# Patient Record
Sex: FEMALE | Race: WHITE | NOT HISPANIC OR LATINO | Employment: FULL TIME | URBAN - METROPOLITAN AREA
[De-identification: names, ages, dates, MRNs, and addresses within clinical notes are randomized per-mention and may not be internally consistent; named-entity substitution may affect disease eponyms.]

---

## 2017-07-21 ENCOUNTER — CONVERSION ENCOUNTER (OUTPATIENT)
Dept: OTHER | Facility: HOSPITAL | Age: 52
End: 2017-07-21

## 2018-07-25 ENCOUNTER — CONVERSION ENCOUNTER (OUTPATIENT)
Dept: OTHER | Facility: HOSPITAL | Age: 53
End: 2018-07-25

## 2018-09-06 ENCOUNTER — OFFICE VISIT (OUTPATIENT)
Dept: ORTHOPEDIC SURGERY | Facility: CLINIC | Age: 53
End: 2018-09-06

## 2018-09-06 DIAGNOSIS — M25.532 LEFT WRIST PAIN: Primary | ICD-10-CM

## 2018-09-06 DIAGNOSIS — M67.432 GANGLION CYST OF VOLAR ASPECT OF LEFT WRIST: ICD-10-CM

## 2018-09-06 PROCEDURE — 99214 OFFICE O/P EST MOD 30 MIN: CPT | Performed by: ORTHOPAEDIC SURGERY

## 2018-09-06 RX ORDER — PHENYTOIN SODIUM 100 MG/1
CAPSULE, EXTENDED RELEASE ORAL
COMMUNITY
Start: 2012-05-01

## 2018-09-06 RX ORDER — LISINOPRIL 5 MG/1
5 TABLET ORAL DAILY
COMMUNITY

## 2018-09-06 RX ORDER — ROSUVASTATIN CALCIUM 10 MG/1
10 TABLET, COATED ORAL DAILY
COMMUNITY

## 2018-09-06 NOTE — PROGRESS NOTES
Chief Complaint   Patient presents with   • Left Wrist - Pain             HPI the patient is here today for left wrist pain and discomfort.  The patient has developed a mass on the volar aspect of the wrist.  It is about 2 cm in size.  She states that it has been present on the volar aspect of the wrist for about 3-4 months.  She states that it is becoming larger in size and progressively more symptomatic.  The mass bothers her when she is working and impairs her  strength.  She denies any recent history of trauma to this area.  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  When she is lifting heavy weights the mass is more symptomatic and painful for her.  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  This high level of physical activity causes increased symptoms.          No Known Allergies      Social History     Social History   • Marital status:      Spouse name: N/A   • Number of children: N/A   • Years of education: N/A     Occupational History   • Not on file.     Social History Main Topics   • Smoking status: Not on file   • Smokeless tobacco: Not on file   • Alcohol use Not on file   • Drug use: Unknown   • Sexual activity: Not on file     Other Topics Concern   • Not on file     Social History Narrative   • No narrative on file       History reviewed. No pertinent family history.    No past surgical history on file.    History reviewed. No pertinent past medical history.        There were no vitals filed for this visit.          Review of Systems   Constitutional: Negative.    HENT: Negative.    Eyes: Negative.    Respiratory: Negative.    Cardiovascular: Negative.    Gastrointestinal: Negative.    Endocrine: Negative.    Genitourinary: Negative.    Musculoskeletal: Positive for joint swelling.   Skin: Negative.    Allergic/Immunologic: Negative.    Neurological: Negative.    Hematological: Negative.    Psychiatric/Behavioral:  Negative.            Physical Exam   Constitutional: She appears well-developed and well-nourished.   HENT:   Head: Atraumatic.   Eyes: EOM are normal.   Neck: Neck supple.   Cardiovascular: Normal rate and regular rhythm.    Pulmonary/Chest: Breath sounds normal.   Abdominal: Bowel sounds are normal.   Musculoskeletal: She exhibits edema and tenderness.   Neurological: She is alert.   Skin: Skin is warm. Capillary refill takes 2 to 3 seconds.   Psychiatric: She has a normal mood and affect. Her behavior is normal. Judgment and thought content normal.   Nursing note and vitals reviewed.              Joint/Body Part Specific Exam:  Left Wrist: There is a 2 cm mass on the volar aspect of the radius.  It is in the immediate vicinity of the radial artery.  No abnormal pulses or bruits are noted.  She has tenderness along the sheath of the FCR tendon.  There is mild thickening of the synovitis membrane of the wrist joint.  Cap refill is 2 seconds with a brisk return.  Median nerve function is well preserved.  Radial artery pulses are palpable.  There is no evidence of a reflex sympathetic dystrophy.  She has good  strength and good strength of wrist flexion and extension.  The mass is appropriately tender.          X-RAY Report:            Diagnostics:            Krystyna was seen today for pain.    Diagnoses and all orders for this visit:    Left wrist pain    Ganglion cyst of volar aspect of left wrist            Procedures          I provided this patient with educational materials regarding bone health and cast or splint care.        Plan:   Use a wrist splint to protect the volar soft tissues and to compress the ganglion cyst.    Intra-articular steroid injection discussed with the patient but she declines stating that she wants to have a surgical resection of this ganglion cyst.    Tablet ibuprofen 600 mg orally twice a day for pain swelling and discomfort.    Schedule a left volar ganglion cyst resection on an  outpatient basis.    Risks and benefits and time off work discussed with the patient at length.    Time spent in the office today with the patient going over the rationale for surgical intervention and the postoperative management is 30 minutes.  Greater than 50% of my time was spent face-to-face with the patient discussing the surgical options and potential complications.        CC To Jeffrey Ross MD

## 2018-09-11 PROBLEM — M67.432 GANGLION CYST OF VOLAR ASPECT OF LEFT WRIST: Status: ACTIVE | Noted: 2018-09-11

## 2018-09-14 ENCOUNTER — TELEPHONE (OUTPATIENT)
Dept: ORTHOPEDIC SURGERY | Facility: CLINIC | Age: 53
End: 2018-09-14

## 2018-09-24 ENCOUNTER — OUTSIDE FACILITY SERVICE (OUTPATIENT)
Dept: ORTHOPEDIC SURGERY | Facility: CLINIC | Age: 53
End: 2018-09-24

## 2018-09-24 PROCEDURE — 25111 REMOVE WRIST TENDON LESION: CPT | Performed by: ORTHOPAEDIC SURGERY

## 2018-09-28 ENCOUNTER — OFFICE VISIT (OUTPATIENT)
Dept: ORTHOPEDIC SURGERY | Facility: CLINIC | Age: 53
End: 2018-09-28

## 2018-09-28 DIAGNOSIS — M67.432 GANGLION CYST OF VOLAR ASPECT OF LEFT WRIST: Primary | ICD-10-CM

## 2018-09-28 PROCEDURE — 99024 POSTOP FOLLOW-UP VISIT: CPT | Performed by: ORTHOPAEDIC SURGERY

## 2018-09-28 NOTE — PROGRESS NOTES
Chief Complaint   Patient presents with   • Left Wrist - Post-op   • Wound Check         HPI the patient is here today for left volar ganglion cyst excision that was done on 9/24/18 she is doing very well postoperatively.  There are no fevers, rigors or chills.  Neurovascular status is intact.  Cap refill is 2 seconds with a brisk return.  She is moving her fingers well and is pleased with her surgical outcome.  She is going to see the cardiologist for the heart conduction block that was detected on the preoperative EKG.        There were no vitals filed for this visit.        Joint/Body Part Specific Exam:  Left wrist: The incision on the volar aspect of the wrist is healing nicely.  Appropriate amounts of swelling and bruising are noted.  Radial artery pulses are palpable.  Cap refill is 2 seconds with a brisk return.  Sensation is intact to light touch.  She has intact median nerve function.  She is doing very well postoperatively and states that she is glad that the pain and discomfort from the volar ganglion cyst has resolved following the resection.      X-RAY REPORT:        Krystyna was seen today for wound check and post-op.    Diagnoses and all orders for this visit:    Ganglion cyst of volar aspect of left wrist            Procedures        Instructions:      Plan: Incision care.    Gentle active mobilization of the wrist and the fingers.    Ice to the area of the incision.    Patient has been given a 1 week off work so that she can recuperate from the surgical incision.    Pathology of the ganglion cyst discussed with the patient.    Tablet ibuprofen 600 mg orally twice a day for pain swelling and discomfort.    We did detect a first degree heart block on the  Pre- Operative testing and therefore I have recommended to the patient that she should see a cardiologist for evaluation of that heart block.  The patient tells me that she has a cardiology appointment next week for evaluation of the cardiac status of  the conduction system of the heart.    Follow-up in my office in 6 weeks.

## 2018-10-16 ENCOUNTER — OFFICE VISIT CONVERTED (OUTPATIENT)
Dept: CARDIOLOGY | Facility: CLINIC | Age: 53
End: 2018-10-16
Attending: INTERNAL MEDICINE

## 2018-10-16 ENCOUNTER — CONVERSION ENCOUNTER (OUTPATIENT)
Dept: CARDIOLOGY | Facility: CLINIC | Age: 53
End: 2018-10-16

## 2018-11-08 ENCOUNTER — OFFICE VISIT (OUTPATIENT)
Dept: ORTHOPEDIC SURGERY | Facility: CLINIC | Age: 53
End: 2018-11-08

## 2018-11-08 DIAGNOSIS — M67.432 GANGLION CYST OF VOLAR ASPECT OF LEFT WRIST: Primary | ICD-10-CM

## 2018-11-08 PROCEDURE — 99024 POSTOP FOLLOW-UP VISIT: CPT | Performed by: ORTHOPAEDIC SURGERY

## 2018-11-26 ENCOUNTER — CONVERSION ENCOUNTER (OUTPATIENT)
Dept: CARDIOLOGY | Facility: CLINIC | Age: 53
End: 2018-11-26
Attending: INTERNAL MEDICINE

## 2019-07-29 ENCOUNTER — HOSPITAL ENCOUNTER (OUTPATIENT)
Dept: OTHER | Facility: HOSPITAL | Age: 54
Discharge: HOME | End: 2019-07-29
Attending: FAMILY MEDICINE

## 2020-08-18 ENCOUNTER — CONVERSION ENCOUNTER (OUTPATIENT)
Dept: SURGERY | Facility: CLINIC | Age: 55
End: 2020-08-18

## 2020-08-18 ENCOUNTER — OFFICE VISIT CONVERTED (OUTPATIENT)
Dept: SURGERY | Facility: CLINIC | Age: 55
End: 2020-08-18
Attending: NURSE PRACTITIONER

## 2020-10-28 ENCOUNTER — HOSPITAL ENCOUNTER (OUTPATIENT)
Dept: PREADMISSION TESTING | Facility: HOSPITAL | Age: 55
Discharge: HOME | End: 2020-10-28
Attending: SURGERY

## 2020-10-30 LAB — SARS-COV-2 RNA SPEC QL NAA+PROBE: NOT DETECTED

## 2020-11-02 ENCOUNTER — HOSPITAL ENCOUNTER (OUTPATIENT)
Dept: GASTROENTEROLOGY | Facility: HOSPITAL | Age: 55
Setting detail: HOSPITAL OUTPATIENT SURGERY
Discharge: HOME | End: 2020-11-02
Attending: SURGERY

## 2020-12-01 ENCOUNTER — TELEPHONE CONVERTED (OUTPATIENT)
Dept: SURGERY | Facility: CLINIC | Age: 55
End: 2020-12-01
Attending: NURSE PRACTITIONER

## 2020-12-01 ENCOUNTER — CONVERSION ENCOUNTER (OUTPATIENT)
Dept: SURGERY | Facility: CLINIC | Age: 55
End: 2020-12-01

## 2021-05-10 NOTE — H&P
History and Physical      Patient Name: Krystyna Covarrubias   Patient ID: [id number]   Sex: Female   YOB: 1965    Primary Care Provider: Jeffrey Ross MD   Referring Provider: Jeffrey Ross MD    Visit Date: August 18, 2020    Provider: [facility] Main   Location[facility]   Location Address: [address] [place] [address]  [id number]   Location Phone: [phone]          Chief Complaint  · Requesting colonoscopy  · Age 50 or over  · FH of colon cancer  · Here today for a pre-surgical colon screening visit  · Personal History of Polyps      History Of Present Illness  The patient is a 54 year old /White female presenting to the [facility] office on a referral from Jeffrey Ross MD.   Krystyna Covarrubias needs to have a screening colonoscopy.   Patient states that they have had a colonoscopy. 3 years ago   Patient currently complains of: no complaints   Patient Does have family history of colon cancer. Father      Patient presents today on referral from Dr. Jeffrey Ross for screening colonoscopy.  Patient denies any abdominal pain, diarrhea, or rectal bleeding.  Patient admits to family history of colon cancer with her father.  Patient admits to history of colonic polyps with high-grade dysplasia.    8/2017 - Colonoscopy (Michael): Ascending - Tubulovillous adenoma with high grade dysplasia.       Past Medical History  Disease Name Date Onset Notes   Arthritis --  --    Colon polyp --  --    Epilepsy --  --    High blood pressure --  --    High cholesterol --  --    Hypothyroid --  --          Past Surgical History  Procedure Name Date Notes   Breast Surgery --  --    Cholecystectomy --  --    Colonoscopy --  --          Medication List  Name Date Started Instructions   Crestor 10 mg oral tablet  take 1 tablet (10 mg) by oral route once daily   Dilantin Extended 100 mg oral capsule  take 1 capsule (100 mg) by oral route 4 times per day   levothyroxine 137 mcg oral tablet  take 1 tablet  "(137 mcg) by oral route once daily   lisinopril 5 mg oral tablet  take 1 tablet (5 mg) by oral route once daily   pantoprazole 40 mg oral tablet,delayed release (DR/EC)  take 1 tablet (40 mg) by oral route once daily   vitamin B complex oral tablet  --    Vitamin D3 50 mcg (2,000 unit) oral capsule  --          Allergy List  Allergen Name Date Reaction Notes   NO KNOWN DRUG ALLERGIES --  --  --        Allergies Reconciled  Family Medical History  Disease Name Relative/Age Notes   Diabetes, unspecified type Brother/  Father/  Sister/   Father; Brother; Sister   Renal Cancer Sister/   --    Family history of colon cancer Father/80s   Father/80s         Social History  Finding Status Start/Stop Quantity Notes   Alcohol Current some day --/-- --  drinks rarely; wine   Caffeine Current every day --/-- --  drinks regularly; 3-4 times per day   Second hand smoke exposure Current some day --/-- --  yes   Tobacco Former [age] --  former smoker; started smoking at age 16; quit smoking at age 47         Review of Systems  · Constitutional  o Denies  o : fever, chills  · Eyes  o Denies  o : yellowish discoloration of eyes  · HENT  o Denies  o : difficulty swallowing  · Cardiovascular  o Denies  o : chest pain, chest pain on exertion  · Respiratory  o Denies  o : shortness of breath  · Gastrointestinal  o Denies  o : nausea, vomiting, diarrhea, constipation  · Genitourinary  o Denies  o : abnormal color of urine  · Integument  o Denies  o : rash  · Neurologic  o Denies  o : tingling or numbness  · Musculoskeletal  o Denies  o : joint pain  · Endocrine  o Denies  o : weight gain, weight loss      Vitals  Date Time BP Position Site L\R Cuff Size HR RR TEMP (F) WT  HT  BMI kg/m2 BSA m2 O2 Sat        08/18/2020 02:00 PM      86 - R   175lbs 16oz 5'  2\" 32.19 1.87 93 %          Physical Examination  · Constitutional  o Appearance  o : well developed, well-nourished, patient in no apparent distress  · Head and Face  o Head  o : "   § Inspection  § : atraumatic, normocephalic  o Face  o :   § Inspection  § : no facial lesions  · Eyes  o Conjunctivae  o : conjunctivae normal  o Sclerae  o : sclerae white  · Neck  o Inspection/Palpation  o : normal appearance, no masses or tenderness, trachea midline  · Respiratory  o Respiratory Effort  o : breathing unlabored  · Skin and Subcutaneous Tissue  o General Inspection  o : no lesions present, no areas of discoloration, skin turgor normal, texture normal  · Neurologic  o Mental Status Examination  o :   § Orientation  § : grossly oriented to person, place and time  § Attention  § : attention normal, concentration abilities normal  § Fund of Knowledge  § : fund of knowledge within normal limits, patient aware of current events  o Gait and Station  o : normal gait, able to stand without difficulty  · Psychiatric  o Judgement and Insight  o : judgment and insight intact  o Mood and Affect  o : mood normal, affect appropriate              Assessment  · Family History of Colon Cancer     V16.0/Z80.0  · Personal history of colonic polyps     V12.72/Z86.010  · Screening for colon cancer     V76.51/Z12.11  · Pre-op testing     V72.84/Z01.818  · High grade dysplasia in colonic adenoma     211.3/D12.6      Plan  · Orders  o Consent for Colonoscopy Screening-Possible risk/complications, benefits, and alternatives to surgical or invasive procedure have been explained to patient and/or legal guardian. -Patient has been evaluated and can tolerate anesthesia and/or sedation. Risks, benefits, and alternatives to anesthesia and sedation have been explained to patient and/or legal guardian. () - V76.51/Z12.11, V12.72/Z86.010, V16.0/Z80.0, 211.3/D12.6 - 11/02/2020  o [facility] Pre-Op Covid-19 Screening (15756) - V72.84/Z01.818 - 10/28/2020   [address] @ 2:30pm  · Medications  o Medications have been Reconciled  o Transition of Care or Provider Policy  · Instructions  o Surgical Facility: [facility]  Hospital  o Handouts Provided Pre-Procedure Instructions including date, time, and location of procedure.   o PLAN: Proceeed with colonoscopy. Patient understands risks/benefits and is willing to proceed.   o ***Surgical Orders***  o RISK AND BENEFITS:  o Given these options, the patient has verbally expressed an understanding of the risks of the surgery and finds these risks acceptable. Will proceed with surgery as soon as possible.  o O.R. PREP: Per protocol   o IV: Per Anesthesia  o Please sign permit for: Colonoscopy with possible biopsies by Dr. Rodriguez.  o The above History and Physical Examination has been completed within 30 days of admission.  o ***Patient Status***  o Outpatient  o Follow up in the in the office post procedure.  o Advised patient she would need COVID-19 testing prior to procedure. Encourage patient to self isolate in between testing and procedure. Patient verbalizes understanding is willing to proceed  o Electronically Identified Patient Education Materials Provided Electronically  · Disposition  o Call or Return if symptoms worsen or persist.            Electronically Signed by: RICCO Main -Author on August 18, 2020 03:00:55 PM

## 2021-05-13 NOTE — PROGRESS NOTES
"   Quick Note      Patient Name: Krystyna Covarrubias   Patient ID: [id number]   Sex: Female   YOB: 1965    Primary Care Provider: Jeffrey Ross MD   Referring Provider: Jeffrey Ross MD    Visit Date: December 1, 2020    Provider: [facility] Main   Location: [facility]   Location Address: [address] [place] [address]  [id number]   Location Phone: [phone]          History Of Present Illness  TELEHEALTH TELEPHONE VISIT  Krystyna Covarrubias is a 55 year old /White female who is presenting for evaluation via telehealth telephone visit. Verbal consent obtained before beginning visit.   Provider spent 12 minutes with the patient during the telehealth visit.   The following staff were present during this visit: Carin Padron CMA   Past Medical History/ Overview of Patient Symptoms     This is a telephone follow-up visit to discuss results of colonoscopy.  Patient underwent a colonoscopy on 11/2/2020 performed by Dr. Grant Rodriguez.    Patient was with a tubular adenoma in the ascending colon and 2 hyperplastic polyps of the sigmoid colon per colonoscopy/pathology.  Patient denies any postoperative complications.       Vitals  Date Time BP Position Site L\R Cuff Size HR RR TEMP (F) WT  HT  BMI kg/m2 BSA m2 O2 Sat FR L/min FiO2 HC       12/01/2020 02:35 PM         164lbs 0oz 5'  2\" 30 1.8                 Assessment  · Hyperplastic colon polyp     211.3/K63.5  · Tubular adenoma     229.9/D36.9  · S/P colonoscopic polypectomy     V45.89/Z98.890    Problems Reconciled  Plan  · Orders  o Physician Telephone Evaluation, 11-20 minutes ([id number]) - 211.3/K63.5, 229.9/D36.9, V45.89/Z98.890 - 12/01/2020  · Medications  o Medications have been Reconciled  o Transition of Care or Provider Policy  · Instructions  o Plan Of Care: Rescreen: In 5 years. Follow-up in the interim as needed  o Electronically Identified Patient Education Materials Provided Electronically            Electronically Signed " by: RICCO Main -Author on December 1, 2020 02:41:59 PM

## 2021-05-14 VITALS — HEIGHT: 62 IN | BODY MASS INDEX: 32.39 KG/M2 | HEART RATE: 86 BPM | OXYGEN SATURATION: 93 % | WEIGHT: 176 LBS

## 2021-05-14 VITALS — WEIGHT: 164 LBS | HEIGHT: 62 IN | BODY MASS INDEX: 30.18 KG/M2

## 2021-05-16 VITALS
BODY MASS INDEX: 32.02 KG/M2 | WEIGHT: 174 LBS | SYSTOLIC BLOOD PRESSURE: 131 MMHG | HEIGHT: 62 IN | DIASTOLIC BLOOD PRESSURE: 62 MMHG | HEART RATE: 85 BPM

## 2024-11-05 ENCOUNTER — TRANSCRIBE ORDERS (OUTPATIENT)
Dept: ADMINISTRATIVE | Facility: HOSPITAL | Age: 59
End: 2024-11-05
Payer: COMMERCIAL

## 2024-11-05 DIAGNOSIS — R09.89 BRUIT: ICD-10-CM

## 2024-11-05 DIAGNOSIS — R07.9 CHEST PAIN, UNSPECIFIED TYPE: Primary | ICD-10-CM

## 2024-12-05 ENCOUNTER — HOSPITAL ENCOUNTER (OUTPATIENT)
Dept: NUCLEAR MEDICINE | Facility: HOSPITAL | Age: 59
Discharge: HOME | End: 2024-12-05
Payer: COMMERCIAL

## 2024-12-05 DIAGNOSIS — R07.9 CHEST PAIN, UNSPECIFIED TYPE: ICD-10-CM

## 2024-12-05 LAB
BH CV IMMEDIATE POST TECH DATA BLOOD PRESSURE: NORMAL MMHG
BH CV IMMEDIATE POST TECH DATA HEART RATE: 109 BPM
BH CV IMMEDIATE POST TECH DATA OXYGEN SATS: 97 %
BH CV REST NUCLEAR ISOTOPE DOSE: 10.2 MCI
BH CV SIX MINUTE RECOVERY TECH DATA BLOOD PRESSURE: NORMAL
BH CV SIX MINUTE RECOVERY TECH DATA HEART RATE: 90 BPM
BH CV SIX MINUTE RECOVERY TECH DATA OXYGEN SATURATION: 96 %
BH CV STRESS BP STAGE 1: NORMAL
BH CV STRESS COMMENTS STAGE 1: NORMAL
BH CV STRESS DOSE REGADENOSON STAGE 1: 0.4
BH CV STRESS DURATION MIN STAGE 1: 0
BH CV STRESS DURATION SEC STAGE 1: 10
BH CV STRESS HR STAGE 1: 110
BH CV STRESS NUCLEAR ISOTOPE DOSE: 32.7 MCI
BH CV STRESS O2 STAGE 1: 96
BH CV STRESS PROTOCOL 1: NORMAL
BH CV STRESS RECOVERY BP: NORMAL MMHG
BH CV STRESS RECOVERY HR: 90 BPM
BH CV STRESS RECOVERY O2: 96 %
BH CV STRESS STAGE 1: 1
BH CV THREE MINUTE POST TECH DATA BLOOD PRESSURE: NORMAL MMHG
BH CV THREE MINUTE POST TECH DATA HEART RATE: 103 BPM
BH CV THREE MINUTE POST TECH DATA OXYGEN SATURATION: 95 %
LV EF NUC BP: 51 %
MAXIMAL PREDICTED HEART RATE: 159 BPM
PERCENT MAX PREDICTED HR: 69.18 %
STRESS BASELINE BP: NORMAL MMHG
STRESS BASELINE HR: 72 BPM
STRESS O2 SAT REST: 96 %
STRESS PERCENT HR: 81 %
STRESS POST O2 SAT PEAK: 96 %
STRESS POST PEAK BP: NORMAL MMHG
STRESS POST PEAK HR: 110 BPM
STRESS TARGET HR: 135 BPM

## 2024-12-05 PROCEDURE — A9500 TC99M SESTAMIBI: HCPCS | Performed by: SPECIALIST

## 2024-12-05 PROCEDURE — 25010000002 REGADENOSON 0.4 MG/5ML SOLUTION: Performed by: SPECIALIST

## 2024-12-05 PROCEDURE — 78451 HT MUSCLE IMAGE SPECT SING: CPT

## 2024-12-05 PROCEDURE — 34310000005 TECHNETIUM SESTAMIBI: Performed by: SPECIALIST

## 2024-12-05 PROCEDURE — 93017 CV STRESS TEST TRACING ONLY: CPT

## 2024-12-05 RX ORDER — REGADENOSON 0.08 MG/ML
0.4 INJECTION, SOLUTION INTRAVENOUS
Status: COMPLETED | OUTPATIENT
Start: 2024-12-05 | End: 2024-12-05

## 2024-12-05 RX ADMIN — REGADENOSON 0.4 MG: 0.08 INJECTION, SOLUTION INTRAVENOUS at 09:23

## 2024-12-05 RX ADMIN — TECHNETIUM TC 99M SESTAMIBI 1 DOSE: 1 INJECTION INTRAVENOUS at 09:23

## 2024-12-05 RX ADMIN — TECHNETIUM TC 99M SESTAMIBI 1 DOSE: 1 INJECTION INTRAVENOUS at 07:20

## 2024-12-19 ENCOUNTER — HOSPITAL ENCOUNTER (OUTPATIENT)
Dept: CARDIOLOGY | Facility: HOSPITAL | Age: 59
Discharge: HOME | End: 2024-12-19
Admitting: SPECIALIST
Payer: COMMERCIAL

## 2024-12-19 DIAGNOSIS — R09.89 BRUIT: ICD-10-CM

## 2024-12-19 LAB
BH CV XLRA MEAS LEFT CAROTID BULB EDV: 14.9 CM/SEC
BH CV XLRA MEAS LEFT CAROTID BULB PSV: 60.6 CM/SEC
BH CV XLRA MEAS LEFT DIST CCA EDV: 20 CM/SEC
BH CV XLRA MEAS LEFT DIST CCA PSV: 65.7 CM/SEC
BH CV XLRA MEAS LEFT DIST ICA EDV: 35 CM/SEC
BH CV XLRA MEAS LEFT DIST ICA PSV: 92.8 CM/SEC
BH CV XLRA MEAS LEFT ICA/CCA RATIO: 1.7
BH CV XLRA MEAS LEFT MID ICA EDV: 44.3 CM/SEC
BH CV XLRA MEAS LEFT MID ICA PSV: 122.8 CM/SEC
BH CV XLRA MEAS LEFT PROX CCA EDV: 25.1 CM/SEC
BH CV XLRA MEAS LEFT PROX CCA PSV: 94.3 CM/SEC
BH CV XLRA MEAS LEFT PROX ECA EDV: 20 CM/SEC
BH CV XLRA MEAS LEFT PROX ECA PSV: 95 CM/SEC
BH CV XLRA MEAS LEFT PROX ICA EDV: 40 CM/SEC
BH CV XLRA MEAS LEFT PROX ICA PSV: 109.3 CM/SEC
BH CV XLRA MEAS LEFT VERTEBRAL A EDV: 12.1 CM/SEC
BH CV XLRA MEAS LEFT VERTEBRAL A PSV: 46.4 CM/SEC
BH CV XLRA MEAS RIGHT CAROTID BULB EDV: 21.7 CM/SEC
BH CV XLRA MEAS RIGHT CAROTID BULB PSV: 69.4 CM/SEC
BH CV XLRA MEAS RIGHT DIST CCA EDV: 23.5 CM/SEC
BH CV XLRA MEAS RIGHT DIST CCA PSV: 84.7 CM/SEC
BH CV XLRA MEAS RIGHT DIST ICA EDV: 26.6 CM/SEC
BH CV XLRA MEAS RIGHT DIST ICA PSV: 73 CM/SEC
BH CV XLRA MEAS RIGHT ICA/CCA RATIO: 1.1
BH CV XLRA MEAS RIGHT MID ICA EDV: 25.8 CM/SEC
BH CV XLRA MEAS RIGHT MID ICA PSV: 80.2 CM/SEC
BH CV XLRA MEAS RIGHT PROX CCA EDV: 19.6 CM/SEC
BH CV XLRA MEAS RIGHT PROX CCA PSV: 79.9 CM/SEC
BH CV XLRA MEAS RIGHT PROX ECA EDV: 10.2 CM/SEC
BH CV XLRA MEAS RIGHT PROX ECA PSV: 77.5 CM/SEC
BH CV XLRA MEAS RIGHT PROX ICA EDV: 33.8 CM/SEC
BH CV XLRA MEAS RIGHT PROX ICA PSV: 93.8 CM/SEC
BH CV XLRA MEAS RIGHT VERTEBRAL A EDV: 12.8 CM/SEC
BH CV XLRA MEAS RIGHT VERTEBRAL A PSV: 39.4 CM/SEC
LEFT ARM BP: NORMAL MMHG
RIGHT ARM BP: NORMAL MMHG

## 2024-12-19 PROCEDURE — 93880 EXTRACRANIAL BILAT STUDY: CPT

## 2025-05-30 NOTE — PROGRESS NOTES
Chief Complaint   Patient presents with   • Left Wrist - Post-op         HPI The patient is here today for a follow up of her left volar ganglion cyst excision that was done on 9/24/18.  The patient has done very well postoperatively.  Her symptoms seem to be resolving nicely.  She does not have any wrist pain or discomfort.  There are no fevers, rigors or chills.  She has been scheduled to see the cardiologist because she had developed some rhythm abnormalities perioperatively and those will need to be addressed by a specialist.  She does understand that and is following up with a hard specialist soon.        There were no vitals filed for this visit.        Joint/Body Part Specific Exam:  Left wrist: The volar incision is clean fully healed.  There is one retained Vicryl sutures which we will remove in the office today.  She does not have a recurrence of the mass.  The pain and tenderness on the volar aspect of the radiocarpal joint is settled down nicely.  She is pleased with her surgical outcome.  She has full return of range of motion at the wrist both in dorsiflexion as well as and volar flexion.  She is able to circumduct her wrist without any difficulty.  There is no localized tenderness.      X-RAY REPORT:        Krystyna was seen today for post-op.    Diagnoses and all orders for this visit:    Ganglion cyst of volar aspect of left wrist            Procedures        Instructions:      Plan: The patient most certainly needs to have an appointment with her cardiologist because of the rhythm abnormalities that were detected perioperatively.    Incision care.    Avoid repetitive loading and lifting with the wrist to prevent recurrence of the volar ganglion cyst.    Follow-up in my office on a when necessary basis.  
PROVIDER:[TOKEN:[[id number]],FOLLOWUP:[2 weeks],ESTABLISHEDPATIENT:[T]]